# Patient Record
Sex: MALE | Race: BLACK OR AFRICAN AMERICAN | NOT HISPANIC OR LATINO | Employment: FULL TIME | URBAN - METROPOLITAN AREA
[De-identification: names, ages, dates, MRNs, and addresses within clinical notes are randomized per-mention and may not be internally consistent; named-entity substitution may affect disease eponyms.]

---

## 2024-03-12 ENCOUNTER — APPOINTMENT (OUTPATIENT)
Dept: GENERAL RADIOLOGY | Facility: CLINIC | Age: 34
End: 2024-03-12
Attending: FAMILY MEDICINE

## 2024-03-12 ENCOUNTER — HOSPITAL ENCOUNTER (EMERGENCY)
Facility: CLINIC | Age: 34
Discharge: HOME | End: 2024-03-12
Attending: FAMILY MEDICINE | Admitting: FAMILY MEDICINE

## 2024-03-12 VITALS
OXYGEN SATURATION: 100 % | SYSTOLIC BLOOD PRESSURE: 131 MMHG | TEMPERATURE: 97.6 F | DIASTOLIC BLOOD PRESSURE: 84 MMHG | HEART RATE: 72 BPM | RESPIRATION RATE: 18 BRPM

## 2024-03-12 DIAGNOSIS — J98.01 ACUTE BRONCHOSPASM: ICD-10-CM

## 2024-03-12 DIAGNOSIS — R68.89 FLU-LIKE SYMPTOMS: ICD-10-CM

## 2024-03-12 LAB
FLUAV RNA SPEC QL NAA+PROBE: NEGATIVE
FLUBV RNA RESP QL NAA+PROBE: NEGATIVE
GROUP A STREP BY PCR: NOT DETECTED
RSV RNA SPEC NAA+PROBE: NEGATIVE
SARS-COV-2 RNA RESP QL NAA+PROBE: NEGATIVE

## 2024-03-12 PROCEDURE — 87637 SARSCOV2&INF A&B&RSV AMP PRB: CPT | Performed by: FAMILY MEDICINE

## 2024-03-12 PROCEDURE — 87651 STREP A DNA AMP PROBE: CPT | Performed by: FAMILY MEDICINE

## 2024-03-12 PROCEDURE — 71046 X-RAY EXAM CHEST 2 VIEWS: CPT

## 2024-03-12 PROCEDURE — 99284 EMERGENCY DEPT VISIT MOD MDM: CPT | Performed by: FAMILY MEDICINE

## 2024-03-12 PROCEDURE — 71046 X-RAY EXAM CHEST 2 VIEWS: CPT | Mod: 26 | Performed by: RADIOLOGY

## 2024-03-12 PROCEDURE — 250N000009 HC RX 250: Performed by: FAMILY MEDICINE

## 2024-03-12 PROCEDURE — 250N000013 HC RX MED GY IP 250 OP 250 PS 637: Performed by: FAMILY MEDICINE

## 2024-03-12 PROCEDURE — 99284 EMERGENCY DEPT VISIT MOD MDM: CPT | Mod: 25 | Performed by: FAMILY MEDICINE

## 2024-03-12 PROCEDURE — 94640 AIRWAY INHALATION TREATMENT: CPT | Performed by: FAMILY MEDICINE

## 2024-03-12 RX ORDER — IPRATROPIUM BROMIDE AND ALBUTEROL SULFATE 2.5; .5 MG/3ML; MG/3ML
3 SOLUTION RESPIRATORY (INHALATION) ONCE
Status: COMPLETED | OUTPATIENT
Start: 2024-03-12 | End: 2024-03-12

## 2024-03-12 RX ORDER — ALBUTEROL SULFATE 90 UG/1
2 AEROSOL, METERED RESPIRATORY (INHALATION) EVERY 6 HOURS PRN
Qty: 18 G | Refills: 1 | Status: SHIPPED | OUTPATIENT
Start: 2024-03-12

## 2024-03-12 RX ORDER — ALBUTEROL SULFATE 90 UG/1
2 AEROSOL, METERED RESPIRATORY (INHALATION) EVERY 6 HOURS PRN
Status: DISCONTINUED | OUTPATIENT
Start: 2024-03-12 | End: 2024-03-12 | Stop reason: HOSPADM

## 2024-03-12 RX ADMIN — IPRATROPIUM BROMIDE AND ALBUTEROL SULFATE 3 ML: .5; 3 SOLUTION RESPIRATORY (INHALATION) at 12:12

## 2024-03-12 RX ADMIN — ALBUTEROL SULFATE 2 PUFF: 90 AEROSOL, METERED RESPIRATORY (INHALATION) at 14:28

## 2024-03-12 ASSESSMENT — COLUMBIA-SUICIDE SEVERITY RATING SCALE - C-SSRS
1. IN THE PAST MONTH, HAVE YOU WISHED YOU WERE DEAD OR WISHED YOU COULD GO TO SLEEP AND NOT WAKE UP?: NO
2. HAVE YOU ACTUALLY HAD ANY THOUGHTS OF KILLING YOURSELF IN THE PAST MONTH?: NO
6. HAVE YOU EVER DONE ANYTHING, STARTED TO DO ANYTHING, OR PREPARED TO DO ANYTHING TO END YOUR LIFE?: NO

## 2024-03-12 ASSESSMENT — ACTIVITIES OF DAILY LIVING (ADL)
ADLS_ACUITY_SCORE: 33

## 2024-03-12 NOTE — ED TRIAGE NOTES
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 Had headaches for 3 days. Feels his voice is hoarse, started 2 days ago. States he has difficulty breathing.

## 2024-03-12 NOTE — LETTER
March 12, 2024      To Whom It May Concern:      Sukh Beach was seen in our Emergency Department today, 03/12/24. He is clear to return to work.      Sincerely,        Michael Gilbert MD.

## 2024-03-12 NOTE — DISCHARGE INSTRUCTIONS
Home.  Chest Xray and other testing negative.  May be viral or irritant cause.  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  Use albuterol inhaler for breathing.  Follow up with MD  Return if any concerns at all.    Please make an appointment to follow [facility] with [facility] (phone: [phone]), [facility] (phone: [phone]), and [facility] Care - [facility] (phone: [phone]) as soon as possible.      Results for orders placed or performed during the hospital encounter of 03/12/24   XR Chest 2 Views     Status: None    Narrative    EXAM: XR CHEST 2 VIEWS 3/12/2024 12:44 PM    HISTORY: sob welding exposure.    COMPARISON: None.    TECHNIQUE: Upright frontal and lateral views of the chest.    FINDINGS: Normal trachea, cardial mediastinal silhouette, and major  vasculature. No pneumothorax or pleural effusion. No focal airspace  consolidation. Osseous structures and upper abdomen within normal  limits.      Impression    IMPRESSION: Normal chest.    I have personally reviewed the examination and initial interpretation  and I agree with the findings.    JESSICA SERRANO MD         SYSTEM ID:  [id number]   Symptomatic Influenza A/B, RSV, & SARS-CoV2 PCR (COVID-19) Nose     Status: Normal    Specimen: Nose; Swab   Result Value Ref Range    Influenza A PCR Negative Negative    Influenza B PCR Negative Negative    RSV PCR Negative Negative    SARS CoV2 PCR Negative Negative    Narrative    Testing was performed using the Xpert Xpress CoV2/Flu/RSV Assay on the [employer] Instrument. This test should be ordered for the detection of SARS-CoV-2, influenza, and RSV viruses in individuals who meet clinical and/or epidemiological criteria. Test performance is unknown in asymptomatic patients. This test is for in vitro diagnostic use under the FDA EUA for laboratories certified under CLIA to perform high or moderate complexity testing. This test has  not been FDA cleared or approved. A negative result does not rule out the presence of PCR inhibitors in the specimen or target RNA in concentration below the limit of detection for the assay. If only one viral target is positive but coinfection with multiple targets is suspected, the sample should be re-tested with another FDA cleared, approved, or authorized test, if coinfection would change clinical management. This test was validated by the [facility]. These laboratories are certified under the Clinical Laboratory Improvement Amendments of 1988 (CLIA-88) as qualified to perform high complexity laboratory testing.   Group A Streptococcus PCR Throat Swab     Status: Normal    Specimen: Throat; Swab   Result Value Ref Range    Group A strep by PCR Not Detected Not Detected    Narrative    The Xpert Xpress Strep A test, performed on the [employer] Systems, is a rapid, qualitative in vitro diagnostic test for the detection of Streptococcus pyogenes (Group A ß-hemolytic Streptococcus, Strep A) in throat swab specimens from patients with signs and symptoms of pharyngitis. The Xpert Xpress Strep A test can be used as an aid in the diagnosis of Group A Streptococcal pharyngitis. The assay is not intended to monitor treatment for Group A Streptococcus infections. The Xpert Xpress Strep A test utilizes an automated real-time polymerase chain reaction (PCR) to detect Streptococcus pyogenes DNA.

## 2024-03-12 NOTE — ED PROVIDER NOTES
"ED Provider Note  [facility]        History          Chief Complaint   Patient presents with    Shortness of Breath    Headache      HPI  Sukh Beach is a 33 year old male with a PMH of asthma who presents to the ER for evaluation of SOB and headache. The patient states that he began having a headache 5 days ago. He also developed a mild sore throat and a hoarse voice. He states that his breathing has become more labored and he has some body aches. He has some difficulty breathing at rest which is exacerbated with activity.    He believes his symptoms are due to \"metal fume fever\" from his work as a . 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 He has tried ibuprofen which has helped his headaches. He has a history of asthma but has been out of his albuterol inhaler for 6 months. His asthma is typically worsened by seasonal allergies and pets. This feels different than the typical chest tightness and wheezing that he experiences with asthma exacerbations. Denies fever, chills, runny nose, sinus pressure, cough, chest pain, abdominal pain, diarrhea, nausea, vomiting, or pain with urination.      Past Medical History  Past Medical History   No past medical history on file.     Past Surgical History   No past surgical history on file.     No current outpatient medications on file.     No Known Allergies  Family History  Family History   No family history on file.     Social History   Denies tobacco use or illicit drug use.     A medically appropriate review of systems was performed with pertinent positives and negatives noted in the HPI, and all other systems negative.     Physical Exam   BP: 131/84  Pulse: 72  Temp: 97.6  F (36.4  C)  Resp: 18  SpO2: 100 %  Physical Exam  Gen:A&Ox3, no acute distress  HEENT: PERRL, no facial tenderness or wounds, head atraumatic, " oropharynx clear, mucous membranes moist  Neck: No lymphadenopathy, no JVD, trachea midline  CV: RRR without murmurs  PULM:Clear to auscultation bilaterally, no wheezes  Abd: Soft, nontender, nondistended. Bowel sounds present and normal  UE:No traumatic injuries, skin normal  LE:no traumatic injuries, skin normal  Neuro:CN II-XII intact, strength 5/5 throughout  Skin: no rashes or ecchymoses      ED Course, Procedures, & Data   Procedures            No results found for any visits on 03/12/24.  Medications - No data to display  Labs Ordered and Resulted from Time of ED Arrival to Time of ED Departure - No data to display  No orders to display         Critical care was not performed.     Medical Decision Making  The patient's presentation was of moderate complexity (an acute illness with systemic symptoms).    The patient's evaluation involved:  review of external note(s) from 3+ sources (see separate area of note for details)  ordering and/or review of 3+ test(s) in this encounter (see separate area of note for details)  discussion of management or test interpretation with another health professional (see separate area of note for details)    The patient's management necessitated moderate risk (prescription drug management including medications given in the ED).     Assessment & Plan    Sukh Beach is a 33 year old male PMH of asthma who presents to the ER for evaluation of headache and difficulty breathing. Ddx includes inhalation injury from an irritant exposure at work, asthma exacerbation, and viral URI. Will obtain respiratory viral panel as well as strep PCR to rule out infectious etiology. Will also obtain a CXR. Pt received a duoneb in the ED.    Labs:  - Strep PCR  - Influenza, RSV, COVID panel    Imaging:  - CXR    - Duoneb    Patient given DuoNeb feels better with this.  X-ray done and personally reviewed by myself chest x-ray is negative for any acute infiltrates pneumothorax etc.  COVID influenza RSV  and strep testing were negative.  Reassessed patient at this point patient is comfortable going did give him an albuterol inhaler and a prescription for this clinics for referral at this point it may be an irritant that is causing some bronchospasm it may be infectious viral more likely with some other multiple symptoms.  Patient will work on getting good ventilation where he is working as the weather is warmer he can have better ventilation where he is welding and make sure he takes breaks note for work was given patient and discharged.       I have reviewed the nursing notes. I have reviewed the findings, diagnosis, plan and need for follow up with the patient.         New Prescriptions     No medications on file         Final diagnoses:   Acute bronchiospasm  Flu like symptoms         Michael Gilbert MD  [facility] EMERGENCY DEPARTMENT  3/12/2024      --    ED Attending Physician Attestation    I Michael Gilbert MD, cared for this patient with the Medical Student. I performed, or re-performed, the physical exam and medical decision-making. I have verified the accuracy of all the medical student findings and documentation above, and have edited as necessary.    Summary of HPI, PE, ED Course   Patient is a 33 year old male evaluated in the emergency department for sob. Exam and ED course notable for influenza and rsv and covid neg  and strep cxr neg improved with duoneb. After the completion of care in the emergency department, the patient was discharged.          Michael Gilbert MD  Emergency Medicine     This note was created at least in part by the use of dragon voice dictation system. Inadvertent typographical errors may still exist.  Michael Gilbert MD.  Patient evaluated in the emergency department during the COVID-19 pandemic period. Careful attention to patients safety was addressed throughout the evaluation. Evaluation and treatment management was initiated with disposition made  efficiently and appropriate as possible to minimize any risk of potential exposure to patient during this evaluation.         Michael Gilbert MD  03/12/24 [id number]

## 2024-03-12 NOTE — LETTER
March 12, 2024      To Whom It May Concern:      Sukh Beach was seen in our Emergency Department today, 03/12/24.  I expect his condition to improve over the next few days.  He may return to work when improved.    Sincerely,        Michael Gilbert MD